# Patient Record
Sex: FEMALE | Race: WHITE | NOT HISPANIC OR LATINO | Employment: FULL TIME | URBAN - METROPOLITAN AREA
[De-identification: names, ages, dates, MRNs, and addresses within clinical notes are randomized per-mention and may not be internally consistent; named-entity substitution may affect disease eponyms.]

---

## 2024-09-17 ENCOUNTER — TELEPHONE (OUTPATIENT)
Dept: FAMILY MEDICINE CLINIC | Facility: CLINIC | Age: 34
End: 2024-09-17

## 2024-09-17 NOTE — TELEPHONE ENCOUNTER
Lab order has been placed patient can schedule fasting lab appointment at our office and also need to set up annual physical appointment, thanks

## 2024-09-17 NOTE — TELEPHONE ENCOUNTER
Caller: KIRA SAINI    Relationship: Mother    Best call back number: [phone] OK TO LEAVE MESSAGE/ NOT ON VERBAL  [phone] (PATIENT)       What was the call regarding: PATIENT WAS INSTRUCTED TO DO LABS. MOM IS WANTING TO SPEAK TO SOMEONE IN THE OFFICE TO SEE WHERE ELSE SHE CAN GO TO DO LABS BESIDES THE OFFICE AND TO CHECK IF ORDER IS IN THE SYSTEM (THERE IS NO [facility] VERBAL LISTED FOR THE OFFICE AND HUB COULD NOT SHARE INFO WITH HER)

## 2024-09-18 NOTE — TELEPHONE ENCOUNTER
CALLED AND SPOKE TO PATIENT REGARDING HER LABS AND ALSO TALKED TO HER ABOUT HER APPOINTMENT THAT WAS SCHEDULED FOR FRIDAY WITH JOHNIE. I RESCHEDULED IT WITH DR. ANDRADE AND CHANGED IT TO A PHYSICAL.

## 2024-09-24 ENCOUNTER — OFFICE VISIT (OUTPATIENT)
Dept: FAMILY MEDICINE CLINIC | Facility: CLINIC | Age: 34
End: 2024-09-24
Payer: COMMERCIAL

## 2024-09-24 VITALS
RESPIRATION RATE: 16 BRPM | BODY MASS INDEX: 35.39 KG/M2 | OXYGEN SATURATION: 98 % | HEART RATE: 87 BPM | DIASTOLIC BLOOD PRESSURE: 84 MMHG | WEIGHT: 207.3 LBS | HEIGHT: 64 IN | TEMPERATURE: 97.5 F | SYSTOLIC BLOOD PRESSURE: 122 MMHG

## 2024-09-24 DIAGNOSIS — Z00.00 ENCOUNTER FOR WELL ADULT EXAM WITHOUT ABNORMAL FINDINGS: Primary | ICD-10-CM

## 2024-09-24 DIAGNOSIS — E03.9 HYPOTHYROIDISM, UNSPECIFIED TYPE: ICD-10-CM

## 2024-09-24 PROCEDURE — 99395 PREV VISIT EST AGE 18-39: CPT | Performed by: FAMILY MEDICINE

## 2024-09-24 RX ORDER — LEVOTHYROXINE SODIUM 75 UG/1
75 TABLET ORAL DAILY
Qty: 90 TABLET | Refills: 3 | Status: SHIPPED | OUTPATIENT
Start: 2024-09-24

## 2024-09-29 LAB
ALBUMIN SERPL-MCNC: 4 G/DL (ref 3.9–4.9)
ALP SERPL-CCNC: 70 IU/L (ref 44–121)
ALT SERPL-CCNC: 8 IU/L (ref 0–32)
AMBIG ABBREV CMP14 DEFAULT: NORMAL
AMBIG ABBREV LP DEFAULT: NORMAL
AST SERPL-CCNC: 16 IU/L (ref 0–40)
BILIRUB SERPL-MCNC: 0.2 MG/DL (ref 0–1.2)
BUN SERPL-MCNC: 13 MG/DL (ref 6–20)
BUN/CREAT SERPL: 16 (ref 9–23)
CALCIUM SERPL-MCNC: 9.2 MG/DL (ref 8.7–10.2)
CHLORIDE SERPL-SCNC: 106 MMOL/L (ref 96–106)
CHOLEST SERPL-MCNC: 154 MG/DL (ref 100–199)
CO2 SERPL-SCNC: 17 MMOL/L (ref 20–29)
CREAT SERPL-MCNC: 0.79 MG/DL (ref 0.57–1)
EGFRCR SERPLBLD CKD-EPI 2021: 101 ML/MIN/1.73
ERYTHROCYTE [DISTWIDTH] IN BLOOD BY AUTOMATED COUNT: 12 % (ref 11.7–15.4)
GLOBULIN SER CALC-MCNC: 3.2 G/DL (ref 1.5–4.5)
GLUCOSE SERPL-MCNC: 85 MG/DL (ref 70–99)
HCT VFR BLD AUTO: 42.1 % (ref 34–46.6)
HDLC SERPL-MCNC: 43 MG/DL
HGB BLD-MCNC: 13.8 G/DL (ref 11.1–15.9)
LDLC SERPL CALC-MCNC: 96 MG/DL (ref 0–99)
MCH RBC QN AUTO: 30.2 PG (ref 26.6–33)
MCHC RBC AUTO-ENTMCNC: 32.8 G/DL (ref 31.5–35.7)
MCV RBC AUTO: 92 FL (ref 79–97)
PLATELET # BLD AUTO: 310 X10E3/UL (ref 150–450)
POTASSIUM SERPL-SCNC: 4.5 MMOL/L (ref 3.5–5.2)
PROT SERPL-MCNC: 7.2 G/DL (ref 6–8.5)
RBC # BLD AUTO: 4.57 X10E6/UL (ref 3.77–5.28)
SODIUM SERPL-SCNC: 138 MMOL/L (ref 134–144)
TRIGL SERPL-MCNC: 77 MG/DL (ref 0–149)
TSH SERPL DL<=0.005 MIU/L-ACNC: 3.47 UIU/ML (ref 0.45–4.5)
VLDLC SERPL CALC-MCNC: 15 MG/DL (ref 5–40)
WBC # BLD AUTO: 6.9 X10E3/UL (ref 3.4–10.8)

## 2024-12-23 ENCOUNTER — PATIENT ROUNDING (BHMG ONLY) (OUTPATIENT)
Dept: FAMILY MEDICINE CLINIC | Facility: CLINIC | Age: 34
End: 2024-12-23
Payer: COMMERCIAL

## 2024-12-23 ENCOUNTER — OFFICE VISIT (OUTPATIENT)
Dept: FAMILY MEDICINE CLINIC | Facility: CLINIC | Age: 34
End: 2024-12-23
Payer: COMMERCIAL

## 2024-12-23 VITALS
TEMPERATURE: 97.3 F | HEART RATE: 85 BPM | SYSTOLIC BLOOD PRESSURE: 112 MMHG | DIASTOLIC BLOOD PRESSURE: 68 MMHG | WEIGHT: 214 LBS | HEIGHT: 64 IN | BODY MASS INDEX: 36.54 KG/M2 | OXYGEN SATURATION: 98 %

## 2024-12-23 DIAGNOSIS — E66.812 CLASS 2 OBESITY DUE TO EXCESS CALORIES WITHOUT SERIOUS COMORBIDITY WITH BODY MASS INDEX (BMI) OF 36.0 TO 36.9 IN ADULT: ICD-10-CM

## 2024-12-23 DIAGNOSIS — E03.9 HYPOTHYROIDISM, UNSPECIFIED TYPE: Primary | ICD-10-CM

## 2024-12-23 DIAGNOSIS — E66.09 CLASS 2 OBESITY DUE TO EXCESS CALORIES WITHOUT SERIOUS COMORBIDITY WITH BODY MASS INDEX (BMI) OF 36.0 TO 36.9 IN ADULT: ICD-10-CM

## 2024-12-23 PROBLEM — Z83.3 FAMILY HISTORY OF DIABETES MELLITUS: Status: ACTIVE | Noted: 2024-12-23

## 2024-12-23 PROCEDURE — 99213 OFFICE O/P EST LOW 20 MIN: CPT | Performed by: NURSE PRACTITIONER

## 2024-12-23 NOTE — PROGRESS NOTES
"    Alesia Guerin is a 34 y.o. female.     History of Present Illness  34-year-old white female with history of hypothyroidism who comes in today to establish as a new patient    Blood pressure 112/68 heart rate 84 she denies any chest pain, dyspnea, tachycardia or dizziness    Patient has no new complaints she simply wants to get established.  Her weight is 214 with a BMI 37.7.  She has not been vaccinated for COVID or influenza.  She is up-to-date on eye exams and does get yearly mammograms due to being a high risk. She is going to have the results scanned to me.  She does have an OB/GYN and is up-to-date on Pap smears.  Her recent labs in the chart which are for the most part all normal          Follow-up 6 months  Have copy mammogram sent to office                   The following portions of the patient's history were reviewed and updated as appropriate: allergies, current medications, past family history, past medical history, past social history, past surgical history, and problem list.    Vitals:    12/23/24 1318   BP: 112/68   BP Location: Right arm   Patient Position: Sitting   Cuff Size: Adult   Pulse: 85   Temp: 97.3 °F (36.3 °C)   TempSrc: Infrared   SpO2: 98%   Weight: 97.1 kg (214 lb)   Height: 162.6 cm (64.02\")       Past Medical History:   Diagnosis Date    Allergic     Seasonal    Disease of thyroid gland     Headache     It has been awhile since I have had a migraine    Hyperthyroidism     I had this until my thyroid oblation    Hypothyroidism     Seasonal allergies      Past Surgical History:   Procedure Laterality Date    ADENOIDECTOMY      APPENDECTOMY      CHOLECYSTECTOMY      TONSILLECTOMY       Family History   Problem Relation Age of Onset    Arthritis Mother         Degenerative    Cancer Mother         Breast    Cancer Father         Lymphoma    Diabetes Father         Type 1    Heart disease Father     Hyperlipidemia Father     Anxiety disorder Maternal Grandmother     Cancer Maternal " Grandmother         Colon    Depression Maternal Grandmother     Thyroid disease Maternal Grandmother     Cancer Paternal Grandfather         Kidney    Heart disease Paternal Grandfather     Hyperlipidemia Paternal Grandfather     Arthritis Paternal Grandmother     Asthma Paternal Grandmother     Anxiety disorder Maternal Aunt     Arthritis Maternal Aunt     Thyroid disease Maternal Aunt      Immunization History   Administered Date(s) Administered    DTP 02/06/1991, 03/06/1991, 04/17/1991, 04/24/1992, 04/19/1995    Flu Vaccine Quad PF 6-35MO 10/12/2015    Fluzone  >6mos 01/26/2014    HPV Quadrivalent 08/09/2007, 10/17/2007, 02/20/2008    Hep B, Adolescent or Pediatric 03/01/2000    Hep B, Unspecified 06/21/1999, 08/04/1999    HiB 10/09/1991, 12/20/1991    Hib (HbOC) 08/06/1991    IPV 07/15/2004    Influenza Seasonal Injectable 12/02/2004, 12/28/2010    MMR 04/07/1992, 06/12/2002    OPV 01/04/1991, 03/06/1991, 04/17/1991    PPD Test 06/03/2013, 06/02/2014, 02/03/2020    Polio, Unspecified 04/24/1992    Td (TDVAX) 07/09/2003    Tdap 08/11/2008, 08/27/2019, 05/21/2024    Varicella 03/01/2000, 07/31/2006       Orders Only on 09/28/2024   Component Date Value Ref Range Status    Glucose 09/28/2024 85  70 - 99 mg/dL Final    BUN 09/28/2024 13  6 - 20 mg/dL Final    Creatinine 09/28/2024 0.79  0.57 - 1.00 mg/dL Final    EGFR Result 09/28/2024 101  >59 mL/min/1.73 Final    BUN/Creatinine Ratio 09/28/2024 16  9 - 23 Final    Sodium 09/28/2024 138  134 - 144 mmol/L Final    Potassium 09/28/2024 4.5  3.5 - 5.2 mmol/L Final    Chloride 09/28/2024 106  96 - 106 mmol/L Final    Total CO2 09/28/2024 17 (L)  20 - 29 mmol/L Final    Calcium 09/28/2024 9.2  8.7 - 10.2 mg/dL Final    Total Protein 09/28/2024 7.2  6.0 - 8.5 g/dL Final    Albumin 09/28/2024 4.0  3.9 - 4.9 g/dL Final    Globulin 09/28/2024 3.2  1.5 - 4.5 g/dL Final    Total Bilirubin 09/28/2024 0.2  0.0 - 1.2 mg/dL Final    Alkaline Phosphatase 09/28/2024 70  44 - 121  IU/L Final    AST (SGOT) 09/28/2024 16  0 - 40 IU/L Final    ALT (SGPT) 09/28/2024 8  0 - 32 IU/L Final    WBC 09/28/2024 6.9  3.4 - 10.8 x10E3/uL Final    RBC 09/28/2024 4.57  3.77 - 5.28 x10E6/uL Final    Hemoglobin 09/28/2024 13.8  11.1 - 15.9 g/dL Final    Hematocrit 09/28/2024 42.1  34.0 - 46.6 % Final    MCV 09/28/2024 92  79 - 97 fL Final    MCH 09/28/2024 30.2  26.6 - 33.0 pg Final    MCHC 09/28/2024 32.8  31.5 - 35.7 g/dL Final    RDW 09/28/2024 12.0  11.7 - 15.4 % Final    Platelets 09/28/2024 310  150 - 450 x10E3/uL Final    Total Cholesterol 09/28/2024 154  100 - 199 mg/dL Final    Triglycerides 09/28/2024 77  0 - 149 mg/dL Final    HDL Cholesterol 09/28/2024 43  >39 mg/dL Final    VLDL Cholesterol Krzysztof 09/28/2024 15  5 - 40 mg/dL Final    LDL Chol Calc (NIH) 09/28/2024 96  0 - 99 mg/dL Final    TSH 09/28/2024 3.470  0.450 - 4.500 uIU/mL Final    Honorio Casas CMP14 Default 09/28/2024 Comment   Final    Comment: A hand-written panel/profile was received from your office. In  accordance with the [facility] Ambiguous Test Code Policy dated July 2003, we have completed your order by using the closest currently  or formerly recognized AMA panel.  We have assigned Comprehensive  Metabolic Panel (14), Test Code #600874 to this request.  If this  is not the testing you wished to receive on this specimen, please  contact the [facility] Client Inquiry/Technical Services Department  to clarify the test order.  We appreciate your business.      Honorio Hopkins LP Default 09/28/2024 Comment   Final    Comment: A hand-written panel/profile was received from your office. In  accordance with the [facility] Ambiguous Test Code Policy dated July 2003, we have completed your order by using the closest currently  or formerly recognized AMA panel.  We have assigned Lipid Panel,  Test Code #801649 to this request. If this is not the testing you  wished to receive on this specimen, please contact the [facility]  Client Inquiry/Technical  Services Department to clarify the test  order.  We appreciate your business.           Review of Systems   Constitutional: Negative.    HENT: Negative.     Respiratory: Negative.     Cardiovascular: Negative.    Gastrointestinal: Negative.    Genitourinary: Negative.    Musculoskeletal: Negative.    Skin: Negative.    Neurological: Negative.    Psychiatric/Behavioral: Negative.         Objective   Physical Exam  Constitutional:       Appearance: Normal appearance.   HENT:      Head: Normocephalic.   Cardiovascular:      Rate and Rhythm: Normal rate and regular rhythm.      Pulses: Normal pulses.      Heart sounds: Normal heart sounds.   Pulmonary:      Effort: Pulmonary effort is normal.      Breath sounds: Normal breath sounds.   Abdominal:      General: Bowel sounds are normal.   Musculoskeletal:         General: Normal range of motion.   Skin:     General: Skin is warm.   Neurological:      General: No focal deficit present.      Mental Status: She is alert and oriented to person, place, and time.   Psychiatric:         Mood and Affect: Mood normal.         Behavior: Behavior normal.         Procedures    Assessment & Plan   Diagnoses and all orders for this visit:    1. Hypothyroidism, unspecified type (Primary)    2. Class 2 obesity due to excess calories without serious comorbidity with body mass index (BMI) of 36.0 to 36.9 in adult           Current Outpatient Medications:     ascorbic acid (VITAMIN C) 1000 MG tablet, , Disp: , Rfl:     Biotin w/ Vitamins C & E (HAIR/SKIN/NAILS PO), , Disp: , Rfl:     BLISOVI FE 1.5/30 1.5-30 MG-MCG tablet, Take 1 tablet by mouth Daily., Disp: , Rfl: 12    cetirizine (zyrTEC) 10 MG tablet, ZYRTEC ALLERGY 10 MG TABS, Disp: , Rfl:     levothyroxine (SYNTHROID, LEVOTHROID) 75 MCG tablet, TAKE 1 TABLET BY MOUTH DAILY, Disp: 90 tablet, Rfl: 3           Rachel Harp, APRN 12/23/2024 13:53 EST  This note has been electronically signed

## 2024-12-23 NOTE — PROGRESS NOTES
A [employer] message has been sent to the patient for PATIENT ROUNDING with [facility].

## 2025-06-23 ENCOUNTER — OFFICE VISIT (OUTPATIENT)
Dept: FAMILY MEDICINE CLINIC | Facility: CLINIC | Age: 35
End: 2025-06-23
Payer: COMMERCIAL

## 2025-06-23 VITALS
HEIGHT: 64 IN | OXYGEN SATURATION: 98 % | WEIGHT: 221 LBS | RESPIRATION RATE: 16 BRPM | TEMPERATURE: 97.1 F | DIASTOLIC BLOOD PRESSURE: 72 MMHG | HEART RATE: 87 BPM | SYSTOLIC BLOOD PRESSURE: 110 MMHG | BODY MASS INDEX: 37.73 KG/M2

## 2025-06-23 DIAGNOSIS — Z91.89 AT HIGH RISK FOR BREAST CANCER: ICD-10-CM

## 2025-06-23 DIAGNOSIS — E66.01 CLASS 2 SEVERE OBESITY DUE TO EXCESS CALORIES WITH SERIOUS COMORBIDITY AND BODY MASS INDEX (BMI) OF 37.0 TO 37.9 IN ADULT: ICD-10-CM

## 2025-06-23 DIAGNOSIS — Z13.220 LIPID SCREENING: ICD-10-CM

## 2025-06-23 DIAGNOSIS — Z83.3 FAMILY HISTORY OF DIABETES MELLITUS: Primary | ICD-10-CM

## 2025-06-23 DIAGNOSIS — E03.9 HYPOTHYROIDISM, UNSPECIFIED TYPE: ICD-10-CM

## 2025-06-23 DIAGNOSIS — E66.812 CLASS 2 SEVERE OBESITY DUE TO EXCESS CALORIES WITH SERIOUS COMORBIDITY AND BODY MASS INDEX (BMI) OF 37.0 TO 37.9 IN ADULT: ICD-10-CM

## 2025-06-23 DIAGNOSIS — Z00.00 PREVENTATIVE HEALTH CARE: ICD-10-CM

## 2025-06-23 PROCEDURE — 99214 OFFICE O/P EST MOD 30 MIN: CPT | Performed by: NURSE PRACTITIONER

## 2025-06-23 RX ORDER — ALBUTEROL SULFATE 90 UG/1
INHALANT RESPIRATORY (INHALATION)
COMMUNITY
Start: 2025-03-12

## 2025-06-23 RX ORDER — LEVOTHYROXINE SODIUM 75 UG/1
75 TABLET ORAL DAILY
Qty: 90 TABLET | Refills: 3 | Status: SHIPPED | OUTPATIENT
Start: 2025-06-23

## 2025-06-23 NOTE — PATIENT INSTRUCTIONS
Fasting blood work  Keep all appointments for mammograms  Diet and exercise as discussed during exam  Follow-up 6 months

## 2025-06-23 NOTE — PROGRESS NOTES
"    Alesia Guerin is a 34 y.o. female.     History of Present Illness  34-year-old white female with history of hypothyroidism and goiter and high risk for breast cancer who comes in today for 6-month follow-up visit fasting blood work    Blood pressure 110/72 heart rate 86 she denies any chest pain, dyspnea, tachycardia or dizziness    Patient has history of goiter and radioactive iodine treatment as a child.  The last 3 years her thyroid levels have been normal we will be rechecking again today    Patient also very high risk for breast cancer in her OB/GYN initially did MRIs on her breast until she turned 30 and now is doing mammograms yearly.  I have requested she send those to the chart    She has no new complaints.  Weight is up 7 pounds at 221 with a BMI 37.9 and she states her mother has been dieting and exercising now.  I did recommend shots if she is unable to lose weight she needs to lose    She has not been vaccinated for COVID she is up-to-date on her eye exams.  She is up-to-date on OB/GYN visits mammograms and Pap smears          Fasting blood work  Keep all appointments for mammograms  Diet and exercise as discussed during exam  Follow-up 6 months  Hypothyroidism  Symptoms include weight gain.        The following portions of the patient's history were reviewed and updated as appropriate: allergies, current medications, past family history, past medical history, past social history, past surgical history, and problem list.    Vitals:    06/23/25 0838   BP: 110/72   BP Location: Right arm   Patient Position: Sitting   Cuff Size: Adult   Pulse: 87   Resp: 16   Temp: 97.1 °F (36.2 °C)   SpO2: 98%   Weight: 100 kg (221 lb)   Height: 162.6 cm (64.02\")       Past Medical History:   Diagnosis Date    Allergic     Seasonal    Disease of thyroid gland     Headache     It has been awhile since I have had a migraine    Hyperthyroidism     I had this until my thyroid oblation    Hypothyroidism     Seasonal " allergies      Past Surgical History:   Procedure Laterality Date    ADENOIDECTOMY      APPENDECTOMY      CHOLECYSTECTOMY      TONSILLECTOMY       Family History   Problem Relation Age of Onset    Arthritis Mother         Degenerative    Cancer Mother         Breast    Cancer Father         Lymphoma    Diabetes Father         Type 1    Heart disease Father     Hyperlipidemia Father     Anxiety disorder Maternal Grandmother     Cancer Maternal Grandmother         Colon    Depression Maternal Grandmother     Thyroid disease Maternal Grandmother     Cancer Paternal Grandfather         Kidney    Heart disease Paternal Grandfather     Hyperlipidemia Paternal Grandfather     Arthritis Paternal Grandmother     Asthma Paternal Grandmother     Anxiety disorder Maternal Aunt     Arthritis Maternal Aunt     Thyroid disease Maternal Aunt      Immunization History   Administered Date(s) Administered    DTP 02/06/1991, 03/06/1991, 04/17/1991, 04/24/1992, 04/19/1995    Flu Vaccine Quad PF 6-35MO 10/12/2015    Fluzone  >6mos 01/26/2014    HPV Quadrivalent 08/09/2007, 10/17/2007, 02/20/2008    Hep B, Adolescent or Pediatric 03/01/2000    Hep B, Unspecified 06/21/1999, 08/04/1999    HiB 10/09/1991, 12/20/1991    Hib (HbOC) 08/06/1991    IPV 07/15/2004    Influenza Seasonal Injectable 12/02/2004, 12/28/2010    MMR 04/07/1992, 06/12/2002    OPV 01/04/1991, 03/06/1991, 04/17/1991    PPD Test 06/03/2013, 06/02/2014, 02/03/2020    Polio, Unspecified 04/24/1992    Td (TDVAX) 07/09/2003    Tdap 08/11/2008, 08/27/2019, 05/21/2024    Varicella 03/01/2000, 07/31/2006       Orders Only on 09/28/2024   Component Date Value Ref Range Status    Glucose 09/28/2024 85  70 - 99 mg/dL Final    BUN 09/28/2024 13  6 - 20 mg/dL Final    Creatinine 09/28/2024 0.79  0.57 - 1.00 mg/dL Final    EGFR Result 09/28/2024 101  >59 mL/min/1.73 Final    BUN/Creatinine Ratio 09/28/2024 16  9 - 23 Final    Sodium 09/28/2024 138  134 - 144 mmol/L Final    Potassium  09/28/2024 4.5  3.5 - 5.2 mmol/L Final    Chloride 09/28/2024 106  96 - 106 mmol/L Final    Total CO2 09/28/2024 17 (L)  20 - 29 mmol/L Final    Calcium 09/28/2024 9.2  8.7 - 10.2 mg/dL Final    Total Protein 09/28/2024 7.2  6.0 - 8.5 g/dL Final    Albumin 09/28/2024 4.0  3.9 - 4.9 g/dL Final    Globulin 09/28/2024 3.2  1.5 - 4.5 g/dL Final    Total Bilirubin 09/28/2024 0.2  0.0 - 1.2 mg/dL Final    Alkaline Phosphatase 09/28/2024 70  44 - 121 IU/L Final    AST (SGOT) 09/28/2024 16  0 - 40 IU/L Final    ALT (SGPT) 09/28/2024 8  0 - 32 IU/L Final    WBC 09/28/2024 6.9  3.4 - 10.8 x10E3/uL Final    RBC 09/28/2024 4.57  3.77 - 5.28 x10E6/uL Final    Hemoglobin 09/28/2024 13.8  11.1 - 15.9 g/dL Final    Hematocrit 09/28/2024 42.1  34.0 - 46.6 % Final    MCV 09/28/2024 92  79 - 97 fL Final    MCH 09/28/2024 30.2  26.6 - 33.0 pg Final    MCHC 09/28/2024 32.8  31.5 - 35.7 g/dL Final    RDW 09/28/2024 12.0  11.7 - 15.4 % Final    Platelets 09/28/2024 310  150 - 450 x10E3/uL Final    Total Cholesterol 09/28/2024 154  100 - 199 mg/dL Final    Triglycerides 09/28/2024 77  0 - 149 mg/dL Final    HDL Cholesterol 09/28/2024 43  >39 mg/dL Final    VLDL Cholesterol Krzysztof 09/28/2024 15  5 - 40 mg/dL Final    LDL Chol Calc (NIH) 09/28/2024 96  0 - 99 mg/dL Final    TSH 09/28/2024 3.470  0.450 - 4.500 uIU/mL Final    Ambig Abbrev CMP14 Default 09/28/2024 Comment   Final    Comment: A hand-written panel/profile was received from your office. In  accordance with the [facility] Ambiguous Test Code Policy dated July 2003, we have completed your order by using the closest currently  or formerly recognized AMA panel.  We have assigned Comprehensive  Metabolic Panel (14), Test Code #648998 to this request.  If this  is not the testing you wished to receive on this specimen, please  contact the [facility] Client Inquiry/Technical Services Department  to clarify the test order.  We appreciate your business.      Honorio Casas LP Default 09/28/2024  Comment   Final    Comment: A hand-written panel/profile was received from your office. In  accordance with the [facility] Ambiguous Test Code Policy dated July 2003, we have completed your order by using the closest currently  or formerly recognized AMA panel.  We have assigned Lipid Panel,  Test Code #783980 to this request. If this is not the testing you  wished to receive on this specimen, please contact the [facility]  Client Inquiry/Technical Services Department to clarify the test  order.  We appreciate your business.           Review of Systems   Constitutional:  Positive for unexpected weight gain.   HENT: Negative.     Respiratory: Negative.     Cardiovascular: Negative.    Gastrointestinal: Negative.    Genitourinary: Negative.    Musculoskeletal: Negative.    Skin: Negative.    Neurological: Negative.    Psychiatric/Behavioral: Negative.         Objective   Physical Exam  Constitutional:       Appearance: Normal appearance.   HENT:      Head: Normocephalic.   Cardiovascular:      Rate and Rhythm: Normal rate and regular rhythm.      Pulses: Normal pulses.      Heart sounds: Normal heart sounds.   Pulmonary:      Effort: Pulmonary effort is normal.      Breath sounds: Normal breath sounds.   Abdominal:      General: Bowel sounds are normal.   Musculoskeletal:         General: Normal range of motion.   Skin:     General: Skin is warm.   Neurological:      General: No focal deficit present.      Mental Status: She is alert and oriented to person, place, and time.   Psychiatric:         Mood and Affect: Mood normal.         Behavior: Behavior normal.         Procedures    Assessment & Plan   Diagnoses and all orders for this visit:    1. Family history of diabetes mellitus (Primary)  -     Comprehensive Metabolic Panel  -     Hemoglobin A1c    2. Hypothyroidism, unspecified type  -     TSH+Free T4  -     T3    3. Preventative health care  -     CBC & Differential    4. Lipid screening  -     Lipid Panel With LDL  / HDL Ratio    Other orders  -     levothyroxine (SYNTHROID, LEVOTHROID) 75 MCG tablet; Take 1 tablet by mouth Daily.  Dispense: 90 tablet; Refill: 3           Current Outpatient Medications:     albuterol sulfate  (90 Base) MCG/ACT inhaler, INHALE 2 PUFFS BY MOUTH EVERY 4 TO 6 HOURS AS NEEDED FOR WHEEZING, Disp: , Rfl:     levothyroxine (SYNTHROID, LEVOTHROID) 75 MCG tablet, Take 1 tablet by mouth Daily., Disp: 90 tablet, Rfl: 3    ascorbic acid (VITAMIN C) 1000 MG tablet, , Disp: , Rfl:     Biotin w/ Vitamins C & E (HAIR/SKIN/NAILS PO), , Disp: , Rfl:     BLISOVI FE 1.5/30 1.5-30 MG-MCG tablet, Take 1 tablet by mouth Daily., Disp: , Rfl: 12    cetirizine (zyrTEC) 10 MG tablet, ZYRTEC ALLERGY 10 MG TABS, Disp: , Rfl:            Rachel Harp, JASEN 6/23/2025 09:03 EDT  This note has been electronically signed

## 2025-06-24 LAB
ALBUMIN SERPL-MCNC: 4 G/DL (ref 3.9–4.9)
ALP SERPL-CCNC: 87 IU/L (ref 44–121)
ALT SERPL-CCNC: 12 IU/L (ref 0–32)
AST SERPL-CCNC: 13 IU/L (ref 0–40)
BASOPHILS # BLD AUTO: 0.1 X10E3/UL (ref 0–0.2)
BASOPHILS NFR BLD AUTO: 1 %
BILIRUB SERPL-MCNC: <0.2 MG/DL (ref 0–1.2)
BUN SERPL-MCNC: 11 MG/DL (ref 6–20)
BUN/CREAT SERPL: 12 (ref 9–23)
CALCIUM SERPL-MCNC: 9.2 MG/DL (ref 8.7–10.2)
CHLORIDE SERPL-SCNC: 107 MMOL/L (ref 96–106)
CHOLEST SERPL-MCNC: 147 MG/DL (ref 100–199)
CO2 SERPL-SCNC: 18 MMOL/L (ref 20–29)
CREAT SERPL-MCNC: 0.91 MG/DL (ref 0.57–1)
EGFRCR SERPLBLD CKD-EPI 2021: 85 ML/MIN/1.73
EOSINOPHIL # BLD AUTO: 0.2 X10E3/UL (ref 0–0.4)
EOSINOPHIL NFR BLD AUTO: 2 %
ERYTHROCYTE [DISTWIDTH] IN BLOOD BY AUTOMATED COUNT: 13 % (ref 11.7–15.4)
GLOBULIN SER CALC-MCNC: 3 G/DL (ref 1.5–4.5)
GLUCOSE SERPL-MCNC: 83 MG/DL (ref 70–99)
HBA1C MFR BLD: 5.4 % (ref 4.8–5.6)
HCT VFR BLD AUTO: 43.9 % (ref 34–46.6)
HDLC SERPL-MCNC: 42 MG/DL
HGB BLD-MCNC: 13.8 G/DL (ref 11.1–15.9)
IMM GRANULOCYTES # BLD AUTO: 0 X10E3/UL (ref 0–0.1)
IMM GRANULOCYTES NFR BLD AUTO: 0 %
LDLC SERPL CALC-MCNC: 82 MG/DL (ref 0–99)
LDLC/HDLC SERPL: 2 RATIO (ref 0–3.2)
LYMPHOCYTES # BLD AUTO: 2.7 X10E3/UL (ref 0.7–3.1)
LYMPHOCYTES NFR BLD AUTO: 33 %
MCH RBC QN AUTO: 29.6 PG (ref 26.6–33)
MCHC RBC AUTO-ENTMCNC: 31.4 G/DL (ref 31.5–35.7)
MCV RBC AUTO: 94 FL (ref 79–97)
MONOCYTES # BLD AUTO: 0.6 X10E3/UL (ref 0.1–0.9)
MONOCYTES NFR BLD AUTO: 7 %
NEUTROPHILS # BLD AUTO: 4.8 X10E3/UL (ref 1.4–7)
NEUTROPHILS NFR BLD AUTO: 57 %
PLATELET # BLD AUTO: 181 X10E3/UL (ref 150–450)
POTASSIUM SERPL-SCNC: 4.3 MMOL/L (ref 3.5–5.2)
PROT SERPL-MCNC: 7 G/DL (ref 6–8.5)
RBC # BLD AUTO: 4.67 X10E6/UL (ref 3.77–5.28)
SODIUM SERPL-SCNC: 140 MMOL/L (ref 134–144)
T3 SERPL-MCNC: 144 NG/DL (ref 71–180)
T4 FREE SERPL-MCNC: 1.16 NG/DL (ref 0.82–1.77)
TRIGL SERPL-MCNC: 128 MG/DL (ref 0–149)
TSH SERPL DL<=0.005 MIU/L-ACNC: 7.3 UIU/ML (ref 0.45–4.5)
VLDLC SERPL CALC-MCNC: 23 MG/DL (ref 5–40)
WBC # BLD AUTO: 8.3 X10E3/UL (ref 3.4–10.8)